# Patient Record
Sex: FEMALE | Race: WHITE | URBAN - METROPOLITAN AREA
[De-identification: names, ages, dates, MRNs, and addresses within clinical notes are randomized per-mention and may not be internally consistent; named-entity substitution may affect disease eponyms.]

---

## 2022-06-27 NOTE — PROGRESS NOTES
[facility] CARDIOLOGY  CARDIAC ELECTROPHYSIOLOGY DEPARTMENT/DIVISION OF CARDIOLOGY  Consultation Report  PATIENT: Praneeth Eisenberg  MEDICAL RECORD NUMBER: [id number]  DATE OF SERVICE:  7/1/2022  ATTENDING ELECTROPHYSIOLOGIST:  Quan Alvarez D.O.  REFERRING PHYSICIAN: Rolando Ozuna MD and Cedrick Kraus MD  CHIEF COMPLAINT: CRT-D insitu    HPI: Praneeth Eisenberg is a 57 y.o. female with a history of NYHA class II HFpEF- improved/nonischemic 2/2 cardiac sarcoidosis +/- LBBB (non-Rhianna) sp [facility] CRT-D (DOI:  2/2019 at [facility]; LV lead revision with extraction of old LV lead and implant of new LV lead: 7/23/2020 at [facility]), HTN, MR, pulmonary sarcoidosis, prior alcohol abuse, varicose veins sp stripping, ROSELYN noncompliant with CPAP, and GERD. She is managed by Dr. Naya Liz with Lipitor 40 mg daily, Coreg 12.5 mg twice daily, Jardiance 25 mg daily, glimepiride 2 mg daily, Entresto 24-26 mg twice daily, spironolactone 25 mg daily, and PPI. In 2011, patient was diagnosed with nonischemic cardiomyopathy of uncertain etiology. A cardiac MRI was concerning for cardiac sarcoidosis. She reportedly has a history of pulmonary sarcoidosis as well. Her LVEF improved with medical therapy. In 2019, she was noted to have recurrence of NICM with LBBB, which was treated with [facility] CRT-D device. In 2020, she required LV lead revision with extraction of old LV lead and implant of new LV lead due to elevated LV lead thresholds and diaphragm stim. Op note reported new LV lead was positioned in a mid, posterolateral position. Her LVEF reportedly improved from 37% to 45% on TTE in 2021. Patient presents today, 7/1/2022; to transfer EP care to my office. He denies any complaints at this time. Prior cardiac testing:   · TTE: 6/23/2021 at [facility]: EF = 45 ± 5% (2D biplane), left ventricle is mildly dilated.  Left ventricular systolic function is mildly decreased  · TTE: 7/13/2020 at [facility]: EF = 37 ±  5% (2D biplane) Grade I left ventricular diastolic dysfunction, left ventricle is moderately dilated. There is mild left ventricular   Hypertrophy  · Cardiac MRI at [facility]: 1/14/2019: LV dyssynchrony is noted, segmental wall motion, abnormalities as described above, EDV = 200 cc (normal  cc);   EDVi = 109 cc/m2 (normal 57-92 cc/m2), - LVEF = 32 % (normal 55-75%). · TTE (5/11/2015 at [facility]): LVEF = 47%, mild LV dilation  · TTE (4/18/2012): LVEF = 35%  · Cardiac MRI (2012): LVEF = 47%, LGE of thin mid myocardial in the septum consistent with nonischemic dilated cardiomyopathy  · LHC (10/24/2011): Dominant RCA, 80% ostial D1 stenosis, 60% proximal LCx stenosis, RCA spasm resolved with intracoronary nitroglycerin. · Cardiac MRI (10/21/2011): LV dilation, focal thinning of LV myocardium with mild enhancement subepicardial location involving the inferior septal segment, which can be seen with cardiac sarcoidosis. · Nuclear stress test (6/28/2011): LVEF = 20% with hypokinetic/akinetic apex, LV dilation,  · TTE (10/18/2011): LVEF = 20%, global hypokinesis, moderate LV dilation, severe R VSD, moderate RV dilation, moderate-severe MR. Past Medical History:   Diagnosis Date    [facility] [address]     Diabetes mellitus ([facility] [address]     Sarcoidosis 2007    Thyroid disease       History reviewed. No pertinent surgical history.      Family History   Problem Relation Age of Onset   Marie Zapata Cancer Mother     Heart Disease Father     Cancer Sister      There is no family history of sudden cardiac arrest    Social History     Tobacco Use    Smoking status: Never Smoker    Smokeless tobacco: Never Used   Substance Use Topics    Alcohol use: Not Currently       Current Outpatient Medications   Medication Sig Dispense Refill    sacubitril-valsartan (ENTRESTO) 24-26 MG per tablet Take 1 tablet by mouth 2 times daily      carvedilol (COREG) 12.5 MG tablet Take 12.5 mg by mouth 2 times daily (with meals)      levothyroxine (SYNTHROID) 100 MCG tablet Take 100 mcg by mouth Daily      spironolactone (ALDACTONE) 25 MG tablet Take 25 mg by mouth daily      atorvastatin (LIPITOR) 40 MG tablet Take 40 mg by mouth daily      metFORMIN (GLUCOPHAGE) 500 MG tablet Take 500 mg by mouth 2 times daily (with meals)      glimepiride (AMARYL) 2 MG tablet Take 2 mg by mouth every morning (before breakfast)      omeprazole (PRILOSEC) 20 MG delayed release capsule Take 20 mg by mouth daily      HYDROcodone-acetaminophen (NORCO) 5-325 MG per tablet Take 1 tablet by mouth every 6 hours as needed for Pain.  empagliflozin (JARDIANCE) 25 MG tablet Take 25 mg by mouth daily       No current facility-administered medications for this visit. No Known Allergies    ROS:   Constitutional: Negative for fever, activity change and appetite change. HENT: Negative for epistaxis. Eyes: Negative for diploplia, blurred vision. Respiratory: Negative for cough, chest tightness, shortness of breath and wheezing. Cardiovascular: pertinent positives in HPI  Gastrointestinal: Negative for abdominal pain and blood in stool. Genitourinary: Negative for hematuria and difficulty urinating. Musculoskeletal: Negative for myalgias and gait problem. Skin: Negative for color change and rash. Neurological: Negative for syncope and light-headedness. Psychiatric/Behavioral: Negative for confusion and agitation. The patient is not nervous/anxious.   Heme: no bleeding disorders, no melena or hematochezia  All other review of systems are negative     PHYSICAL EXAM:  BP 98/72 (Site: Left Upper Arm, Position: Sitting, Cuff Size: Medium Adult)   Pulse 68   Resp 18   Ht 5' 4\" (1.626 m)   Wt 160 lb 9.6 oz (72.8 kg)   BMI 27.57 kg/m²    Constitutional: Well-developed, no acute distress, well groomed  Eyes: conjunctivae normal, no xanthelasma   Ears, Nose, Throat: oral mucosa moist, no cyanosis   Neck: supple, no JVD, no thyromegaly   CV: normal rate, regular rhythm,  no murmurs, rubs, or gallops. PMI is nondisplaced, Peripheral pulses normal including  bilateral radial and pedal pulses are normal in quality  Lungs: clear to auscultation bilaterally, normal respiratory effort without used of accessory muscles, no wheezes  Abdomen: soft, non-tender, bowel sounds present, no masses or hepatomegaly   Extremities: no digital clubbing, no edema   Skin: warm, no rashes   Neuro/Psych: A&O x 3, normal mood and affect  ICD site: Incision clean dry and intact without erythema edema or drainage. Cardiac testing done today:   · EC22: Sinus-V paced, QRS D = 110 ms, QTC = 432 ms  · Device Interrogation/Reprogramming 2022   · Make/Model: CHUCK LAWSONBREON Larry [id number]  · DOI: 2019  · Battery: 3.3 years  · Carron Quirk therapy: DDD  ppm, simultaneous ventricular pacing, D1-M2  · Pacing %: RA = 9.1%, BiV = 98%  · Tachy therapy:  · VF: 222 bpm, ATP x1, 36 J, 40 J, 40 J x 4  · FVT: 8 187 bpm, ATP x3, 36 J, 36 J, 40 J x 2  · VT: 166 bpm, monitor only  · Lead function:  · RA lead: sensing = 2.2 mV, impedance = 490 ohms, threshold = 0.5 V @0.5 msec  · RV lead: sensing = 11.8 mV, impedance = 650 ohms (HV = 86 ohms), threshold = 0.75 V @0.5 msec  · LV lead: sensing = N/A, impedance = 940 ohms, threshold = 1.0 V @ 1.0 msec    Lead programming:  · RA lead: sensitivity = auto mV, output = 1.5 V @0.5 msec  · RV lead: sensitivity = auto mV, output = 2.0 V @0.5 msec  · LV lead: sensitivity = N/A, output = 2.0 V @1.0 msec  · Arrhythmias: None  · Reprogramming included: None  · Overall device function is normal  · All device programmable settings were evaluated per above and in the scanned document, along with iterative adjustments (capture thresholds) to assess and select the most appropriate final programming to provide for consistent delivery of the appropriate therapy and to verify function of the device. Assessment/plan:  1.   NYHA class II HFpEF-improved/borderline/presumed nonischemic 2/2 cardiac sarcoidosis+/- LBBB sp [facility] CRT-D (DOI: 2/2019 at [facility]; LV lead revision: 7/2020 at [facility])  - Etiology of patient's cardiomyopathy is unclear to me. There are reports in her chart of possible cardiac sarcoidosis based on cardiac MRI and history of pulmonary sarcoidosis. However, patient reports she never had pulmonary biopsy or biopsy of anything. She is uncertain how the diagnosis of pulmonary sarcoidosis was made. She did have a non-Rhianna LBBB. A [facility] CRT-D device was implanted in 2019. Her LVEF did improve 37% to 45% based on echo in 2021. Appears no ischemia eval was performed with recurrence of LV systolic dysfunction. Her prior left heart cath in 2012 did note areas of significant stenosis. She complains of fatigue. Recommend pharmacologic nuclear stress test to further evaluate. - Stable device function.  - Patient to enroll in remote monitoring with my office every 91 days.  - Follow-up my office in 1 year. - Medical management per Dr. Yaneth Billings. 2.  ROSELYN  - Noncompliant with CPAP. - She complains of fatigue.  - Recommend she consider evaluation by sleep medicine for CPAP. She will discuss with PCP. I spent a total of 60 minutes reviewing previous notes, test results, and face to face with the patient discussing the diagnosis and importance of compliance with the treatment plan as well as documenting on the day of the visit. Time of the day of service includes:  · Preparing to see the patient (eg. Review of the medical record, such as tests). · Obtaining and/or reviewing separately obtained history. · Ordering prescription medications, tests, and/or procedures. · Communicating results to the patient/family/caregiver. · Counseling/educating the patient/family/caregiver. · Documenting clinical information in the patients electronic record. · Coordination of care for the patient. · Performing a medical appropriate exam and/or evaluation.       Thank you for allowing me to participate in your patient's care. Please call me if there are any questions. Shlomo Hurley D.O.   Cardiac Electrophysiology  [facility]  [facility]) Physicians      CC: Lakia Galeas MD

## 2022-07-01 ENCOUNTER — OFFICE VISIT (OUTPATIENT)
Dept: NON INVASIVE DIAGNOSTICS | Age: 62
End: 2022-07-01
Payer: COMMERCIAL

## 2022-07-01 VITALS
BODY MASS INDEX: 27.42 KG/M2 | SYSTOLIC BLOOD PRESSURE: 98 MMHG | WEIGHT: 160.6 LBS | HEART RATE: 68 BPM | DIASTOLIC BLOOD PRESSURE: 72 MMHG | RESPIRATION RATE: 18 BRPM | HEIGHT: 64 IN

## 2022-07-01 DIAGNOSIS — I50.42 CHRONIC COMBINED SYSTOLIC AND DIASTOLIC HEART FAILURE (HCC): ICD-10-CM

## 2022-07-01 DIAGNOSIS — Z95.810 CARDIAC RESYNCHRONIZATION THERAPY DEFIBRILLATOR (CRT-D) IN PLACE: Primary | ICD-10-CM

## 2022-07-01 PROBLEM — G47.33 OSA (OBSTRUCTIVE SLEEP APNEA): Status: ACTIVE | Noted: 2022-07-01

## 2022-07-01 PROBLEM — T81.30XA WOUND DEHISCENCE: Status: ACTIVE | Noted: 2022-07-01

## 2022-07-01 PROCEDURE — 99205 OFFICE O/P NEW HI 60 MIN: CPT | Performed by: STUDENT IN AN ORGANIZED HEALTH CARE EDUCATION/TRAINING PROGRAM

## 2022-07-01 PROCEDURE — 93000 ELECTROCARDIOGRAM COMPLETE: CPT | Performed by: STUDENT IN AN ORGANIZED HEALTH CARE EDUCATION/TRAINING PROGRAM

## 2022-07-01 RX ORDER — HYDROCODONE BITARTRATE AND ACETAMINOPHEN 5; 325 MG/1; MG/1
1 TABLET ORAL EVERY 6 HOURS PRN
COMMUNITY

## 2022-07-01 RX ORDER — ATORVASTATIN CALCIUM 40 MG/1
40 TABLET, FILM COATED ORAL DAILY
COMMUNITY

## 2022-07-01 RX ORDER — OMEPRAZOLE 20 MG/1
20 CAPSULE, DELAYED RELEASE ORAL DAILY
COMMUNITY

## 2022-07-01 RX ORDER — SACUBITRIL AND VALSARTAN 24; 26 MG/1; MG/1
1 TABLET, FILM COATED ORAL 2 TIMES DAILY
COMMUNITY

## 2022-07-01 RX ORDER — CARVEDILOL 12.5 MG/1
12.5 TABLET ORAL 2 TIMES DAILY WITH MEALS
COMMUNITY

## 2022-07-01 RX ORDER — SPIRONOLACTONE 25 MG/1
25 TABLET ORAL DAILY
COMMUNITY

## 2022-07-01 RX ORDER — GLIMEPIRIDE 2 MG/1
2 TABLET ORAL
COMMUNITY

## 2022-07-01 RX ORDER — LEVOTHYROXINE SODIUM 0.1 MG/1
100 TABLET ORAL DAILY
COMMUNITY

## 2022-07-01 NOTE — PATIENT INSTRUCTIONS
No medication changes at this time. You will be contacted to schedule stress test.  You will be contacted to transfer remote monitoring of ICD to my office every 91 days. Follow-up with my office in 6 months.

## 2022-07-05 ENCOUNTER — TELEPHONE (OUTPATIENT)
Dept: NON INVASIVE DIAGNOSTICS | Age: 62
End: 2022-07-05

## 2022-07-05 NOTE — TELEPHONE ENCOUNTER
----- Message from Derek Pino DO sent at 7/1/2022 12:53 PM EDT -----  Regarding: remote  Please transfer patient's remote to our office.  -Derek Pino DO

## 2022-07-15 ENCOUNTER — TELEPHONE (OUTPATIENT)
Dept: CARDIOLOGY | Age: 62
End: 2022-07-15

## 2022-07-25 ENCOUNTER — TELEPHONE (OUTPATIENT)
Dept: CARDIOLOGY | Age: 62
End: 2022-07-25

## 2022-07-25 NOTE — TELEPHONE ENCOUNTER
Left message for pt to confirm stress for 7/27/22. Instructions given included: nothing to eat/drink after midnight except sips of water, hold all forms of caffeine for 12 hrs prior to test, Check BS in am and hold all DM meds the morning of test.  Advised to call with any questions.

## 2022-07-27 ENCOUNTER — HOSPITAL ENCOUNTER (OUTPATIENT)
Dept: CARDIOLOGY | Age: 62
Discharge: HOME | End: 2022-07-27
Payer: COMMERCIAL

## 2022-07-27 VITALS
RESPIRATION RATE: 16 BRPM | HEART RATE: 60 BPM | BODY MASS INDEX: 27.31 KG/M2 | SYSTOLIC BLOOD PRESSURE: 108 MMHG | HEIGHT: 64 IN | DIASTOLIC BLOOD PRESSURE: 64 MMHG | WEIGHT: 160 LBS

## 2022-07-27 DIAGNOSIS — I50.42 CHRONIC COMBINED SYSTOLIC AND DIASTOLIC HEART FAILURE (HCC): ICD-10-CM

## 2022-07-27 DIAGNOSIS — R94.31 ABNORMAL EKG: Primary | ICD-10-CM

## 2022-07-27 PROCEDURE — 2580000003 HC RX 258: Performed by: INTERNAL MEDICINE

## 2022-07-27 PROCEDURE — 93017 CV STRESS TEST TRACING ONLY: CPT

## 2022-07-27 PROCEDURE — 78452 HT MUSCLE IMAGE SPECT MULT: CPT

## 2022-07-27 PROCEDURE — 3430000000 HC RX DIAGNOSTIC RADIOPHARMACEUTICAL: Performed by: INTERNAL MEDICINE

## 2022-07-27 PROCEDURE — 6360000002 HC RX W HCPCS: Performed by: STUDENT IN AN ORGANIZED HEALTH CARE EDUCATION/TRAINING PROGRAM

## 2022-07-27 PROCEDURE — A9500 TC99M SESTAMIBI: HCPCS | Performed by: INTERNAL MEDICINE

## 2022-07-27 RX ORDER — SODIUM CHLORIDE 0.9 % (FLUSH) 0.9 %
10 SYRINGE (ML) INJECTION PRN
Status: DISCONTINUED | OUTPATIENT
Start: 2022-07-27 | End: 2022-07-28 | Stop reason: HOSPADM

## 2022-07-27 RX ADMIN — REGADENOSON 0.4 MG: 0.08 INJECTION, SOLUTION INTRAVENOUS at 10:42

## 2022-07-27 RX ADMIN — SODIUM CHLORIDE, PRESERVATIVE FREE 10 ML: 5 INJECTION INTRAVENOUS at 10:43

## 2022-07-27 RX ADMIN — SODIUM CHLORIDE, PRESERVATIVE FREE 10 ML: 5 INJECTION INTRAVENOUS at 09:34

## 2022-07-27 RX ADMIN — SODIUM CHLORIDE, PRESERVATIVE FREE 10 ML: 5 INJECTION INTRAVENOUS at 10:42

## 2022-07-27 RX ADMIN — Medication 10.2 MILLICURIE: at 09:34

## 2022-07-27 RX ADMIN — Medication 31.5 MILLICURIE: at 10:42

## 2022-07-27 NOTE — PROCEDURES
[address] V[address] [facility]   [address] [facility] [address], [address] [facility]  [phone]                Pharmacologic Stress Nuclear Gated SPECT Study    Name: [address] Account Number: [de-identified]    :  1960          Sex: female         Date of Study:  2022    Height: 5' 4\" (162.6 cm)         Weight: 160 lb (72.6 kg)     Ordering Provider: rTupti Giordano DO          PCP: Nolan Camargo MD      Cardiologist: Marco Antonio Locke MD             Interpreting Physician: Gia Flores MD  _________________________________________________________________________________    Indication:   Detecting the presence and location of coronary artery disease    Clinical History:   Patient has no known history of coronary artery disease. Resting ECG:    VT int .20 sec, QRS int .08 sec, QT int . 40 sec; HR 60 bpm  Normal sinus rhythm and Poor R wave progression    Procedure:   Pharmacologic stress testing was performed with regadenoson 0.4 mg for 15 seconds. Additionally, low-level exercise was performed along with the infusion. The heart rate was 60 at baseline and serafin to 103 beats during the infusion. This corresponds to 66% of maximum predicted heart rate. The blood pressure at baseline was 108/64 and blood pressure at the end of infusion was 122/84. Blood pressure response was normal during the stress procedure. The patient experienced mild shortness of breath and lightheadedness during the infusion that resolved in recovery. ECG during the infusion did not change. IMAGING: Myocardial perfusion imaging was performed at rest 30-35 minutes following the intravenous injection of 10.2 mCi of (Tc-Sestamibi) followed by 10 ml of Normal Saline. As per infusion protocol, the patient was injected intravenously with 31.5 mCi of (Tc-Sestamibi) followed by 10 ml of Normal Saline.   Gated post-stress tomographic imaging was performed 20-25 minutes after

## 2022-07-27 NOTE — DISCHARGE INSTRUCTIONS
[address] and Vascular Lab      Instructions to Patients    The following are the instructions for patients who have had a procedure in our office today. Patient name: Josselyn Christensen    Radionuclide Activity: 40mCi of 99mTc-Sestamibi    Date Administered: 7/27/2022    Expires: 48 hours after scheduled appointment time      Patient may resume normal activity unless otherwise instructed. Patient may resume medications as normal.  If the need should arise, patient may call [phone] between the hours of 8:00am-5:00pm.  After hours there is at least one physician on-call at all times for those patients needing assistance. Patients may call [phone] and the answering service will direct the patient to one of our physicians for assistance. After the patient's test if they are going to be leaving from an airport in the near future they should take this letter with them to verify the test and radionuclide used for their test.      This letter verifies that the above named bearer received an injection of a radionuclide for medical purpose/usage only.         Electronically signed by Sophie Dumont on 7/27/2022 at 10:38 AM

## 2022-08-18 ENCOUNTER — TELEPHONE (OUTPATIENT)
Dept: NON INVASIVE DIAGNOSTICS | Age: 62
End: 2022-08-18

## 2022-08-18 NOTE — TELEPHONE ENCOUNTER
----- Message from Wyatt Levy DO sent at 8/10/2022 12:54 PM EDT -----  Regarding: TWOS  Remote recieved.  Issues with TWOS in the past. Can you bring her in to increase RV sensitivity from 0.5 mV to 2.0 mV?    -Wyatt Levy, DO
Scheduled device clinic appointment for 9/1/2022 @ 1:00    Alyce Gonzalez
[id number] [address]

## 2022-09-01 ENCOUNTER — NURSE ONLY (OUTPATIENT)
Dept: NON INVASIVE DIAGNOSTICS | Age: 62
End: 2022-09-01

## 2022-09-01 NOTE — PROGRESS NOTES
Normal In-Office: With Events  1  Normal Device Function  Events or Alerts: 41  Battery: --, 3.10 yrs  Sensing, impedance and thresholds reviewed and tested  Presenting Rhythm: was reviewed  Underlying Rhythm: was reviewed  Heart Rate Histograms reviewed  Pacing and Detection Parameters were evaluated  Additional Notes:  Presenting: AsBp @ 62 Underlying: AsVs @ 60    Created By: Major Michaels 09/01/2022 14:54  Tachycardia: High Ventricular Rate  1  High ventricular rate event(s) detected within programmed monitor zone  Total episodes: 41    Additional Notes:  TWOS noted on device. Per Dr. Serg Marina RV sensitivity changed from 0.5 mV to 2.0 mV. Discussed changes with Brannon Zuleta representative who assisted with changes. Plan: Will continue with every 91 day remote Merlin transmissions Recall Dr. Serg Marina 1/1/2023.     Major Michaels RN, BSN  Sathya

## 2022-09-26 ENCOUNTER — TELEPHONE (OUTPATIENT)
Dept: NON INVASIVE DIAGNOSTICS | Age: 62
End: 2022-09-26

## 2022-09-26 NOTE — TELEPHONE ENCOUNTER
----- Message from Hakeem Cordova DO sent at 9/21/2022  8:13 PM EDT -----  Regarding: RE: Shanae Thomas DO   ----- Message -----  From: Dany Lawson RN  Sent: 9/9/2022  11:07 AM EDT  To: Hakeem Cordova DO  Subject: RE: TWOS                                         Discussed with Peter Tapia from [facility]. The frequency attenuation filter does not apply to this issue. Recommending further programming of post paced threshold start to 1.2 mv. Ok to bring patient in for reprogramming?   ----- Message -----  From: Hakeem Cordova DO  Sent: 9/7/2022   9:47 PM EDT  To: Dany Lawson RN  Subject: TWOS                                             Patient had TWOS on prior interrogation so RV sensitivity changed from 0.5 to 2.0 mV. Please assess if this [facility] device offers the low frequency attenuation filter.  If device provides this filter, please review with [facility] tech service if they recommend it for this patient.    -Hakeem Cordova DO

## 2022-09-27 ENCOUNTER — TELEPHONE (OUTPATIENT)
Dept: NON INVASIVE DIAGNOSTICS | Age: 62
End: 2022-09-27

## 2022-09-27 NOTE — TELEPHONE ENCOUNTER
Patient is not able to come into the office this Friday. Rescheduled appointment for 10/14/22. She needs to request time off from work.     Cricket Steve RN, BSN  Sathya

## 2022-10-14 ENCOUNTER — NURSE ONLY (OUTPATIENT)
Dept: NON INVASIVE DIAGNOSTICS | Age: 62
End: 2022-10-14

## 2022-12-22 ENCOUNTER — TELEPHONE (OUTPATIENT)
Dept: NON INVASIVE DIAGNOSTICS | Age: 62
End: 2022-12-22

## 2022-12-22 NOTE — TELEPHONE ENCOUNTER
----- Message from Nighat Castanon DO sent at 12/21/2022 10:32 PM EST -----  Regarding: appt  Remote with AsBiVp w/ intermittent post-paced TWOS followed by an AsVs complexes    Per [facility]: Program RV post-paced decay delay to 1.3mV (currently 1.0mV) Atrial post-paced decay delay programmed 1.2mV 10/14/2022 - prev 0.8mV (nominal)    Recommend:  1. Please bring patient into device clinic to perform the above.     Nighat Castanon DO

## 2023-04-20 NOTE — PROGRESS NOTES
obtained history. Communicating results to the patient/family/caregiver. Counseling/educating the patient/family/caregiver. Documenting clinical information in the patients electronic record. Coordination of care for the patient. Performing a medical appropriate exam and/or evaluation. Thank you for allowing me to participate in your patient's care. Please call me if there are any questions. Vanna Pennington D.O.   Cardiac Electrophysiology  [facility]  [facility]) Physicians      CC: Chloe Bazan MD

## 2023-04-21 ENCOUNTER — OFFICE VISIT (OUTPATIENT)
Dept: NON INVASIVE DIAGNOSTICS | Age: 63
End: 2023-04-21

## 2023-04-21 VITALS
DIASTOLIC BLOOD PRESSURE: 76 MMHG | WEIGHT: 159 LBS | SYSTOLIC BLOOD PRESSURE: 112 MMHG | HEIGHT: 64 IN | HEART RATE: 60 BPM | RESPIRATION RATE: 16 BRPM | BODY MASS INDEX: 27.14 KG/M2

## 2023-04-21 DIAGNOSIS — Z95.810 CARDIAC RESYNCHRONIZATION THERAPY DEFIBRILLATOR (CRT-D) IN PLACE: Primary | ICD-10-CM

## 2023-04-21 DIAGNOSIS — I42.8 NONISCHEMIC CARDIOMYOPATHY (HCC): ICD-10-CM

## 2023-04-21 NOTE — PATIENT INSTRUCTIONS
No medication changes at this time. Continue remote monitoring every 91 days. Follow-up with Dr Jason Grace office in 1 year. Please follow-up with billing department regarding cost of last visit.

## 2023-07-31 PROCEDURE — 93296 REM INTERROG EVL PM/IDS: CPT | Performed by: STUDENT IN AN ORGANIZED HEALTH CARE EDUCATION/TRAINING PROGRAM

## 2023-07-31 PROCEDURE — 93295 DEV INTERROG REMOTE 1/2/MLT: CPT | Performed by: STUDENT IN AN ORGANIZED HEALTH CARE EDUCATION/TRAINING PROGRAM

## 2023-08-01 PROCEDURE — G2066 INTER DEVC REMOTE 30D: HCPCS | Performed by: STUDENT IN AN ORGANIZED HEALTH CARE EDUCATION/TRAINING PROGRAM

## 2023-08-01 PROCEDURE — 93297 REM INTERROG DEV EVAL ICPMS: CPT | Performed by: STUDENT IN AN ORGANIZED HEALTH CARE EDUCATION/TRAINING PROGRAM

## 2023-10-30 PROCEDURE — 93295 DEV INTERROG REMOTE 1/2/MLT: CPT | Performed by: STUDENT IN AN ORGANIZED HEALTH CARE EDUCATION/TRAINING PROGRAM

## 2023-10-30 PROCEDURE — 93296 REM INTERROG EVL PM/IDS: CPT | Performed by: STUDENT IN AN ORGANIZED HEALTH CARE EDUCATION/TRAINING PROGRAM

## 2023-10-31 PROCEDURE — 93297 REM INTERROG DEV EVAL ICPMS: CPT | Performed by: STUDENT IN AN ORGANIZED HEALTH CARE EDUCATION/TRAINING PROGRAM

## 2023-10-31 PROCEDURE — G2066 INTER DEVC REMOTE 30D: HCPCS | Performed by: STUDENT IN AN ORGANIZED HEALTH CARE EDUCATION/TRAINING PROGRAM

## 2023-12-15 ENCOUNTER — OFFICE VISIT (OUTPATIENT)
Dept: NON INVASIVE DIAGNOSTICS | Age: 63
End: 2023-12-15

## 2023-12-15 VITALS
DIASTOLIC BLOOD PRESSURE: 60 MMHG | HEIGHT: 64 IN | SYSTOLIC BLOOD PRESSURE: 102 MMHG | BODY MASS INDEX: 27.29 KG/M2 | HEART RATE: 67 BPM

## 2023-12-15 DIAGNOSIS — Z95.810 CARDIAC RESYNCHRONIZATION THERAPY DEFIBRILLATOR (CRT-D) IN PLACE: ICD-10-CM

## 2023-12-15 DIAGNOSIS — I42.8 NONISCHEMIC CARDIOMYOPATHY (HCC): Primary | ICD-10-CM

## 2023-12-15 DIAGNOSIS — G47.33 OSA (OBSTRUCTIVE SLEEP APNEA): ICD-10-CM

## 2023-12-15 DIAGNOSIS — I50.42 CHRONIC COMBINED SYSTOLIC AND DIASTOLIC HEART FAILURE (HCC): ICD-10-CM

## 2023-12-15 DIAGNOSIS — I10 HYPERTENSION, UNSPECIFIED TYPE: ICD-10-CM

## 2023-12-15 RX ORDER — ERGOCALCIFEROL 1.25 MG/1
50000 CAPSULE ORAL WEEKLY
COMMUNITY
Start: 2023-10-28

## 2023-12-15 NOTE — PROGRESS NOTES
\"PLT\"  No results found for: \"NA\", \"K\", \"CL\", \"CO2\", \"BUN\", \"CREATININE\", \"GLUCOSE\", \"CALCIUM\", \"LABGLOM\"   No results found for: \"MG\"  No results found for: \"TSH\", \"TSHREFLEX\", \"TSHFT4\", \"TSHELE\", \"EHC9VKI\", \"TSHHS\"    EKG: SR with V pacing rate of 75 bpm   Please see scan in Cardiology. Device Interrogation/Reprogramming  Make/Model: Lorenzo Ding  DOI: Generator and RV lead: 2/12/2019, LV lead 7/23/2020  Battery: 2 years  TRENGEREID therapy: DDD 60-1 20 ppm  Pacing %: RA = 6%, BiV =  98 %  Tachy therapy:  VF: 222, 36 J, 40 J, 40 J x 4  FVT: 188, 3 times burst plus scan, 36 J, 36 J, 40 J x 2  VT: Monitor 167 bpm  Lead function:  RA lead: sensing = 1.9 mV, impedance = 450 ohms, threshold = 1.5 V @ 0.5 msec  RV lead: sensing = 11.8 mV, impedance = 490 ohms (HV = 72), threshold = 0.7 V @ 0.5 msec  LV lead: sensing = N/A, impedance = 810 ohms, threshold = 1.0 V @ 1.0 msec  Lead programming:  RA lead: sensitivity = auto, output = 2.5 V @ 0.5 msec  RV lead: sensitivity = auto, output = 2.0 V @ 0.5 msec  LV lead: sensitivity = N/A, output = 2.0 V @ 1.0 msec  Arrhythmias: none   Overall device function is normal  All device programmable settings were evaluated per above and in the scanned document, along with iterative adjustments (capture thresholds) to assess and select the most appropriate final programming to provide for consistent delivery of the appropriate therapy and to verify function of the device. Assessment and plan:    1. NYHA class II HFpEF-improved/borderline/nonischemic 2/2 LBBB sp [facility] CRT-D (DOI: 2/2019 at [facility]; LV lead revision: 7/2020 at [facility])  - CRT eval:  -- Indication: LVEF < 35% with non-Rhianna LBBB  -- LV lead location: mid, posterolateral pre op report. No PA/LAT CXR available to me. -- BiV pacing %: 98%  -- LVEF response: pre-CRT = 32% (MRI); post-CRT = 45% (TTE)  - Stable device function.        2. NICM/possible cardiac Sarcoidosis  - There are reports in her chart of possible cardiac

## 2024-01-14 ENCOUNTER — APPOINTMENT (OUTPATIENT)
Dept: GENERAL RADIOLOGY | Age: 64
End: 2024-01-14
Payer: MEDICARE

## 2024-01-14 ENCOUNTER — HOSPITAL ENCOUNTER (EMERGENCY)
Age: 64
Discharge: HOME | End: 2024-01-14
Payer: MEDICARE

## 2024-01-14 VITALS
SYSTOLIC BLOOD PRESSURE: 122 MMHG | TEMPERATURE: 98.1 F | HEART RATE: 66 BPM | WEIGHT: 155 LBS | DIASTOLIC BLOOD PRESSURE: 80 MMHG | RESPIRATION RATE: 18 BRPM | BODY MASS INDEX: 26.46 KG/M2 | OXYGEN SATURATION: 99 % | HEIGHT: 64 IN

## 2024-01-14 DIAGNOSIS — M62.838 SPASM OF MUSCLE: Primary | ICD-10-CM

## 2024-01-14 PROCEDURE — 73030 X-RAY EXAM OF SHOULDER: CPT

## 2024-01-14 PROCEDURE — 6360000002 HC RX W HCPCS: Performed by: NURSE PRACTITIONER

## 2024-01-14 PROCEDURE — 99211 OFF/OP EST MAY X REQ PHY/QHP: CPT

## 2024-01-14 PROCEDURE — 6370000000 HC RX 637 (ALT 250 FOR IP): Performed by: NURSE PRACTITIONER

## 2024-01-14 RX ORDER — NAPROXEN 500 MG/1
500 TABLET ORAL ONCE
Status: DISCONTINUED | OUTPATIENT
Start: 2024-01-14 | End: 2024-01-14

## 2024-01-14 RX ORDER — CYCLOBENZAPRINE HCL 10 MG
10 TABLET ORAL 3 TIMES DAILY PRN
Qty: 12 TABLET | Refills: 0 | Status: SHIPPED | OUTPATIENT
Start: 2024-01-14 | End: 2024-01-18

## 2024-01-14 RX ORDER — NAPROXEN 250 MG/1
500 TABLET ORAL ONCE
Status: COMPLETED | OUTPATIENT
Start: 2024-01-14 | End: 2024-01-14

## 2024-01-14 RX ORDER — NAPROXEN 500 MG/1
500 TABLET ORAL 2 TIMES DAILY WITH MEALS
Qty: 20 TABLET | Refills: 0 | Status: SHIPPED | OUTPATIENT
Start: 2024-01-14

## 2024-01-14 RX ORDER — ORPHENADRINE CITRATE 30 MG/ML
60 INJECTION INTRAMUSCULAR; INTRAVENOUS ONCE
Status: COMPLETED | OUTPATIENT
Start: 2024-01-14 | End: 2024-01-14

## 2024-01-14 RX ORDER — LIDOCAINE 50 MG/G
1 PATCH TOPICAL DAILY PRN
Qty: 10 PATCH | Refills: 0 | Status: SHIPPED | OUTPATIENT
Start: 2024-01-14 | End: 2024-01-24

## 2024-01-14 RX ORDER — CYCLOBENZAPRINE HCL 10 MG
10 TABLET ORAL 3 TIMES DAILY PRN
Qty: 12 TABLET | Refills: 0 | Status: SHIPPED | OUTPATIENT
Start: 2024-01-14 | End: 2024-01-14

## 2024-01-14 RX ORDER — CYCLOBENZAPRINE HCL 10 MG
10 TABLET ORAL ONCE
Status: DISCONTINUED | OUTPATIENT
Start: 2024-01-14 | End: 2024-01-14

## 2024-01-14 RX ADMIN — NAPROXEN 500 MG: 250 TABLET ORAL at 11:52

## 2024-01-14 RX ADMIN — ORPHENADRINE CITRATE 60 MG: 60 INJECTION INTRAMUSCULAR; INTRAVENOUS at 11:53

## 2024-01-14 ASSESSMENT — PAIN DESCRIPTION - LOCATION
LOCATION: SHOULDER
LOCATION: SHOULDER

## 2024-01-14 ASSESSMENT — PAIN SCALES - GENERAL
PAINLEVEL_OUTOF10: 9
PAINLEVEL_OUTOF10: 9

## 2024-01-14 ASSESSMENT — PAIN DESCRIPTION - ORIENTATION
ORIENTATION: RIGHT
ORIENTATION: RIGHT

## 2024-01-14 ASSESSMENT — PAIN DESCRIPTION - DESCRIPTORS
DESCRIPTORS: ACHING
DESCRIPTORS: ACHING

## 2024-01-14 NOTE — ED PROVIDER NOTES
less likely.  Pain is somewhat reproducible.  Did have a lengthy discussion with patient and spouse that there is no bony abnormality, will trial a short course of NSAIDs and muscle relaxers for symptom control as well as lidocaine patch however if she has any new or worsening symptoms such as chest pain, shortness of breath, heart racing or worsening pain she needs to be seen in the emergency room.  Patient and  agreeable with plan and verbalized understanding.    Plan of Care/Counseling:  MICH Smith CNP reviewed today's visit with the patient in addition to providing specific details for the plan of care and counseling regarding the diagnosis and prognosis.  Questions are answered at this time and are agreeable with the plan.    Assessment      1. Spasm of muscle      Plan   Discharged home.  Patient condition is good    New Medications     Discharge Medication List as of 1/14/2024 11:54 AM        START taking these medications    Details   naproxen (NAPROSYN) 500 MG tablet Take 1 tablet by mouth 2 times daily (with meals), Disp-20 tablet, R-0Normal      lidocaine (LIDODERM) 5 % Place 1 patch onto the skin daily as needed for Pain 12 hours on, 12 hours off., Disp-10 patch, R-0Normal           Electronically signed by MICH Smith CNP   DD: 1/14/24  **This report was transcribed using voice recognition software. Every effort was made to ensure accuracy; however, inadvertent computerized transcription errors may be present.  END OF ED PROVIDER NOTE          Cassi Calero APRN - CNP  01/14/24 [id number]

## 2024-01-29 PROCEDURE — 93297 REM INTERROG DEV EVAL ICPMS: CPT | Performed by: STUDENT IN AN ORGANIZED HEALTH CARE EDUCATION/TRAINING PROGRAM

## 2024-01-29 PROCEDURE — 93295 DEV INTERROG REMOTE 1/2/MLT: CPT | Performed by: STUDENT IN AN ORGANIZED HEALTH CARE EDUCATION/TRAINING PROGRAM

## 2024-01-29 PROCEDURE — 93296 REM INTERROG EVL PM/IDS: CPT | Performed by: STUDENT IN AN ORGANIZED HEALTH CARE EDUCATION/TRAINING PROGRAM

## 2024-06-07 ENCOUNTER — OFFICE VISIT (OUTPATIENT)
Dept: NON INVASIVE DIAGNOSTICS | Age: 64
End: 2024-06-07

## 2024-06-07 ENCOUNTER — NURSE ONLY (OUTPATIENT)
Dept: NON INVASIVE DIAGNOSTICS | Age: 64
End: 2024-06-07

## 2024-06-07 VITALS
HEIGHT: 64 IN | HEART RATE: 66 BPM | BODY MASS INDEX: 26.98 KG/M2 | SYSTOLIC BLOOD PRESSURE: 110 MMHG | DIASTOLIC BLOOD PRESSURE: 68 MMHG | WEIGHT: 158 LBS | RESPIRATION RATE: 18 BRPM

## 2024-06-07 DIAGNOSIS — I49.9 IRREGULAR HEART BEAT: Primary | ICD-10-CM

## 2024-06-07 DIAGNOSIS — D86.9 SARCOIDOSIS: ICD-10-CM

## 2024-06-07 DIAGNOSIS — Z95.810 CARDIAC RESYNCHRONIZATION THERAPY DEFIBRILLATOR (CRT-D) IN PLACE: Primary | ICD-10-CM

## 2024-06-07 RX ORDER — LEVOTHYROXINE SODIUM 88 UG/1
TABLET ORAL
COMMUNITY
Start: 2024-05-07

## 2024-06-07 RX ORDER — GLIMEPIRIDE 4 MG/1
4 TABLET ORAL DAILY
COMMUNITY
Start: 2024-05-07

## 2024-06-07 NOTE — PATIENT INSTRUCTIONS
No medication changes at this time.  Continue remote monitoring of pacemaker every 91 days.  In the future, Dr Dobson office will contact you to arrange lead revision procedure.  Follow-up with this office in ~3 months.

## 2024-06-07 NOTE — PROGRESS NOTES
Prior cardiac testing:   Pharmacologic Stress Nuclear: 2022: LVEF = 75%, normal myocardial perfusion.  EC22: Sinus-V paced, QRS D = 110 ms, QTC = 432 ms  [facility]E: [facility]: EF = 45 ± 5% (2D biplane), left ventricle is mildly dilated. Left ventricular systolic function is mildly decreased  TTE: 2020 at [facility]: EF = 37 ±  5% (2D biplane) Grade I left ventricular diastolic dysfunction, left ventricle is moderately dilated. There is mild left ventricular   Hypertrophy  Cardiac MRI at [facility]: 2019: LV dyssynchrony is noted, segmental wall motion, abnormalities as described above, EDV = 200 cc (normal  cc);   EDVi = 109 cc/m2 (normal 57-92 cc/m2), - LVEF = 32 % (normal 55-75%).  TTE (2015 at [facility]): LVEF = 47%, mild LV dilation  TTE (2012): LVEF = 35%  Cardiac MRI (): LVEF = 47%, LGE of thin mid myocardial in the septum consistent with nonischemic dilated cardiomyopathy  LHC (10/24/2011): Dominant RCA, 80% ostial D1 stenosis, 60% proximal LCx stenosis, RCA spasm resolved with intracoronary nitroglycerin.  Cardiac MRI (10/21/2011): LV dilation, focal thinning of LV myocardium with mild enhancement subepicardial location involving the inferior septal segment, which can be seen with cardiac sarcoidosis.  Nuclear stress test (2011): LVEF = 20% with hypokinetic/akinetic apex, LV dilation,  TTE (10/18/2011): LVEF = 20%, global hypokinesis, moderate LV dilation, severe R VSD, moderate RV dilation, moderate-severe MR.       Past Medical History:   Diagnosis Date    CHF (congestive heart failure) (HCC)     Diabetes mellitus (HCC)     Sarcoidosis 2007    Thyroid disease     History reviewed. No pertinent surgical history.   Social History     Tobacco Use    Smoking status: Never    Smokeless tobacco: Never   Vaping Use    Vaping Use: Never used   Substance Use Topics    Alcohol use: Not Currently    Drug use: Never      Family History   Problem Relation Age of Onset    Cancer Mother

## 2024-07-29 PROCEDURE — 93296 REM INTERROG EVL PM/IDS: CPT | Performed by: STUDENT IN AN ORGANIZED HEALTH CARE EDUCATION/TRAINING PROGRAM

## 2024-07-29 PROCEDURE — 93295 DEV INTERROG REMOTE 1/2/MLT: CPT | Performed by: STUDENT IN AN ORGANIZED HEALTH CARE EDUCATION/TRAINING PROGRAM

## 2024-08-05 ENCOUNTER — OFFICE VISIT (OUTPATIENT)
Dept: PULMONOLOGY | Age: 64
End: 2024-08-05
Payer: MEDICARE

## 2024-08-05 VITALS
RESPIRATION RATE: 16 BRPM | WEIGHT: 156 LBS | BODY MASS INDEX: 26.63 KG/M2 | DIASTOLIC BLOOD PRESSURE: 68 MMHG | SYSTOLIC BLOOD PRESSURE: 108 MMHG | TEMPERATURE: 97.3 F | OXYGEN SATURATION: 97 % | HEART RATE: 68 BPM | HEIGHT: 64 IN

## 2024-08-05 DIAGNOSIS — D86.9 SARCOIDOSIS: Primary | ICD-10-CM

## 2024-08-05 DIAGNOSIS — R05.3 CHRONIC COUGH: ICD-10-CM

## 2024-08-05 PROCEDURE — 99203 OFFICE O/P NEW LOW 30 MIN: CPT | Performed by: INTERNAL MEDICINE

## 2024-08-05 PROCEDURE — 3074F SYST BP LT 130 MM HG: CPT | Performed by: INTERNAL MEDICINE

## 2024-08-05 PROCEDURE — 99205 OFFICE O/P NEW HI 60 MIN: CPT | Performed by: INTERNAL MEDICINE

## 2024-08-05 PROCEDURE — 1123F ACP DISCUSS/DSCN MKR DOCD: CPT | Performed by: INTERNAL MEDICINE

## 2024-08-05 PROCEDURE — 3078F DIAST BP <80 MM HG: CPT | Performed by: INTERNAL MEDICINE

## 2024-08-05 NOTE — PROGRESS NOTES
New pt to establish car with pulmonary provider. Pt states she was seen by pulmonary provider over 15 yrs ago and was told she had Asthma and Sarcoidosis. Will plan for PFT and Chest CT to be arranged at [facility] and pt to follow up in office after testing to discuss results. Pt advised office will mail out testing. Appt to follow up in October given.

## 2024-08-05 NOTE — PROGRESS NOTES
Pulmonary Critical Care Medicine      NEW PATIENT VISIT-PULMONARY    8/5/2024     REFERRING PHYSICIAN:  Santiago Porras MD     REASON FOR REFERRAL: Pulmonary sarcoidosis    History of Present Illness    Lucrecia Horn is a 64 y.o. never smoker  female with some degree of secondhand smoke exposure referred here for evaluation of pulmonary sarcoidosis.  She was clinically diagnosed with pulmonary sarcoidosis in 2004 without imaging or biopsy.  Since then she has been given a diagnosis of sarcoidosis.  She had been on intermittent long term steroid therapy for some time.  Now she is off all the steroid therapy.  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  She also had undergone MRI of the heart which suggests infiltrative cardiac sarcoidosis.  She follows up with electrophysiologist and a cardiologist for the same reason.    Symptomatically she has no dyspnea on exertion, cough or shortness of breath in day-to-day physical activities.  She claims that she can climb 2 flights of stairs without stopping.  The only respiratory symptoms she has is early morning cough and scratchy throat with whitish sputum production.  This gets better throughout the day.    She was given a diagnosis of obstructive sleep apnea in the past.  She was also prescribed a CPAP machine but she never used it.  Her  states that he really does not think she has sleep apnea because she sleeps well, occasionally snores but he has never witnessed her stopping breathing in the middle of the night.      Exercise tolerance/MMRC score( Bold )     MMRC Dyspnea Scale  Grade Description of Breathlessness   0 I only get breathless with strenuous exercise.   1 I get short of breath when hurrying on level ground or walking up a slight hill.   2 On level ground, I walk slower than people of the same age because of breathlessness, or have to stop for

## 2024-08-30 ENCOUNTER — HOSPITAL ENCOUNTER (OUTPATIENT)
Dept: CT IMAGING | Age: 64
Discharge: HOME | End: 2024-08-30
Attending: INTERNAL MEDICINE
Payer: MEDICARE

## 2024-08-30 DIAGNOSIS — D86.9 SARCOIDOSIS: ICD-10-CM

## 2024-08-30 PROCEDURE — 71250 CT THORAX DX C-: CPT

## 2024-09-05 ENCOUNTER — HOSPITAL ENCOUNTER (OUTPATIENT)
Dept: PULMONOLOGY | Age: 64
Discharge: HOME | End: 2024-09-05
Attending: INTERNAL MEDICINE
Payer: MEDICARE

## 2024-09-05 DIAGNOSIS — D86.9 SARCOIDOSIS: ICD-10-CM

## 2024-09-05 PROCEDURE — 94726 PLETHYSMOGRAPHY LUNG VOLUMES: CPT

## 2024-09-05 PROCEDURE — 94060 EVALUATION OF WHEEZING: CPT

## 2024-09-05 PROCEDURE — 94729 DIFFUSING CAPACITY: CPT

## 2024-09-24 ENCOUNTER — TELEPHONE (OUTPATIENT)
Dept: BREAST CENTER | Age: 64
End: 2024-09-24

## 2024-09-25 ENCOUNTER — TELEPHONE (OUTPATIENT)
Dept: BREAST CENTER | Age: 64
End: 2024-09-25

## 2024-09-25 DIAGNOSIS — N64.89 OTHER SPECIFIED DISORDERS OF BREAST: Primary | ICD-10-CM

## 2024-09-25 DIAGNOSIS — N63.20 MASS OF LEFT BREAST, UNSPECIFIED QUADRANT: ICD-10-CM

## 2024-09-25 DIAGNOSIS — R93.89 ABNORMAL CT SCAN, CHEST: ICD-10-CM

## 2024-10-11 ENCOUNTER — HOSPITAL ENCOUNTER (OUTPATIENT)
Dept: GENERAL RADIOLOGY | Age: 64
Discharge: HOME | End: 2024-10-13
Payer: MEDICARE

## 2024-10-11 VITALS — BODY MASS INDEX: 27.46 KG/M2 | HEIGHT: 63 IN | WEIGHT: 155 LBS

## 2024-10-11 DIAGNOSIS — N64.89 OTHER SPECIFIED DISORDERS OF BREAST: ICD-10-CM

## 2024-10-11 DIAGNOSIS — R93.89 ABNORMAL CT SCAN, CHEST: ICD-10-CM

## 2024-10-11 DIAGNOSIS — N63.20 MASS OF LEFT BREAST, UNSPECIFIED QUADRANT: ICD-10-CM

## 2024-10-11 PROCEDURE — 76642 ULTRASOUND BREAST LIMITED: CPT

## 2024-10-11 PROCEDURE — G0279 TOMOSYNTHESIS, MAMMO: HCPCS

## 2024-10-16 ENCOUNTER — OFFICE VISIT (OUTPATIENT)
Dept: PULMONOLOGY | Age: 64
End: 2024-10-16
Payer: MEDICARE

## 2024-10-16 VITALS
DIASTOLIC BLOOD PRESSURE: 66 MMHG | SYSTOLIC BLOOD PRESSURE: 119 MMHG | HEART RATE: 73 BPM | RESPIRATION RATE: 20 BRPM | OXYGEN SATURATION: 98 % | TEMPERATURE: 96.9 F

## 2024-10-16 DIAGNOSIS — D86.9 SARCOIDOSIS: Primary | ICD-10-CM

## 2024-10-16 PROCEDURE — 99213 OFFICE O/P EST LOW 20 MIN: CPT | Performed by: INTERNAL MEDICINE

## 2024-10-16 PROCEDURE — 3074F SYST BP LT 130 MM HG: CPT | Performed by: INTERNAL MEDICINE

## 2024-10-16 PROCEDURE — 3078F DIAST BP <80 MM HG: CPT | Performed by: INTERNAL MEDICINE

## 2024-10-16 PROCEDURE — 1123F ACP DISCUSS/DSCN MKR DOCD: CPT | Performed by: INTERNAL MEDICINE

## 2024-10-16 PROCEDURE — 99215 OFFICE O/P EST HI 40 MIN: CPT | Performed by: INTERNAL MEDICINE

## 2024-10-16 RX ORDER — ATORVASTATIN CALCIUM 40 MG/1
40 TABLET, FILM COATED ORAL DAILY
COMMUNITY

## 2024-10-16 RX ORDER — MONTELUKAST SODIUM 10 MG/1
10 TABLET ORAL DAILY
Qty: 30 TABLET | Refills: 3 | Status: SHIPPED | OUTPATIENT
Start: 2024-10-16

## 2024-10-16 RX ORDER — AZELASTINE HYDROCHLORIDE, FLUTICASONE PROPIONATE 137; 50 UG/1; UG/1
1 SPRAY, METERED NASAL
Qty: 1 EACH | Refills: 2 | Status: SHIPPED | OUTPATIENT
Start: 2024-10-16

## 2024-10-16 NOTE — PROGRESS NOTES
Pulmonary Critical Care Medicine      FOLLOW UP  PATIENT VISIT-PULMONARY    10/16/2024     Interval history-   Ms. Lucrecia Horn is being followed here for   Clinically diagnosed sarcoidosis over 20 yrs ago   Chronic clearing of the throat and cough     Since the last visit she underwent CT chest and PFTs , both WNL    Patient feels good ,  is at  baseline health and has no new symptoms or problems . She has had no new symptoms , acute exacerbations , ER visits or hospitalizations  From any respiratory symptoms since the last visit.       Background history -   Lucrecia Horn is a 64 y.o. never smoker  female with some degree of secondhand smoke exposure initially  referred here for evaluation of pulmonary sarcoidosis.  She was clinically diagnosed with pulmonary sarcoidosis in 2004 without imaging or biopsy.  Since then she has been given a diagnosis of sarcoidosis.  She had been on intermittent long term steroid therapy for some time.  Now she is off all the steroid therapy.  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  She also had undergone MRI of the heart which suggests infiltrative cardiac sarcoidosis.  She follows up with electrophysiologist and a cardiologist for the same reason.    Symptomatically she has no dyspnea on exertion, cough or shortness of breath in day-to-day physical activities.  She claims that she can climb 2 flights of stairs without stopping.  The only respiratory symptoms she has is early morning cough and scratchy throat with whitish sputum production.  This gets better throughout the day.    She was given a diagnosis of obstructive sleep apnea in the past.  She was also prescribed a CPAP machine but she never used it.  Her  states that he really does not think she has sleep apnea because she sleeps well, occasionally snores but he has never witnessed her stopping

## 2024-10-16 NOTE — PROGRESS NOTES
Follow up in office today for 2 mos follow up. No changes . Pt to begin Dymista and Singulair meds and follow up in 6 mos; appt given.

## 2024-11-21 ENCOUNTER — TELEPHONE (OUTPATIENT)
Dept: BREAST CENTER | Age: 64
End: 2024-11-21

## 2024-11-21 NOTE — TELEPHONE ENCOUNTER
RN received return call from patient. She had that her appointment was today @ 1pm. RN verbalized understanding and rescheduled patient for 12/12/24 @ 1:30pm with Dr.Reyes.        Electronically signed by Randee Carpio RN on 11/21/24 at 10:44 AM EST

## 2024-11-21 NOTE — TELEPHONE ENCOUNTER
RN Randee Carpio called patient regarding her missed appointment with Dr. Reyes on 11/21/24. Patient did not answer so a message was left asking patient to call office back at [phone] to reschedule this appointment.         Electronically signed by Randee Carpio RN on 11/21/24 at 10:36 AM EST

## 2024-12-10 NOTE — PROGRESS NOTES
Nicole Reyes, DO [facility]  [address] [place] [address]  [phone]    HISTORY & PHYSICAL NOTE     REFERRED BY:  Santiago Porras MD  REASON FOR TODAY'S VISIT:    Chief Complaint   Patient presents with    Breast Problem     Left breast - focal asymmetry  - imaging [place]        SUBJECTIVE     HPI:  Lucrecia Horn is a 64 y.o. female who presents for abnormal CT chest.     Denies any breast issues in the past. Denies any current palpable masses or skin changes. H/o CHF, DM, HLD, hypothyroidism, sarcoidosis.    8/30/2024 -underwent a CT scan of the chest for ruling out sarcoidosis, this did show an incidental finding of a left breast mass measuring 1.2 x 0.9 x 1.3 cm.  Further imaging is recommended.  No axillary lymphadenopathy.    10/11/2024 -bilateral diagnostic mammogram was done which showed BI-RADS density B, there is a 1.8 cm focal asymmetry in the left upper outer breast middle depth.  This asymmetry is not suspiciously changed compared to prior mammograms dating back to 2020.  Stable bilateral mammogram, stable focal asymmetry in the left slightly upper outer breast, BI-RADS 2, recommendation return to annual mammogram.    10/11/2024 -left breast ultrasound was done for the targeted mass found on mammogram and there was no sonographic correlate.    BREAST HISTORY  Her past breast history (prior to this encounter) is as follows:  None.    Has the patient had children? Yes; age of patient at birth of first child, 19 years old.  Approximate age at first menstrual period? 14  Has the patient gone through menopause? Yes; menopause age, 48 years old.  Ashkenazi Yazdanism descent? No  Has the patient ever used hormone replacement therapy? No  Has the patient ever had a breast biopsy? No  Breast Density: B  Family History of Breast Cancer? None  Family History of Ovarian Cancer? None  Family History of other Cancer? Lymphoma in mother and lung cancer in

## 2024-12-12 ENCOUNTER — OFFICE VISIT (OUTPATIENT)
Dept: BREAST CENTER | Age: 64
End: 2024-12-12
Payer: MEDICARE

## 2024-12-12 VITALS
HEIGHT: 63 IN | OXYGEN SATURATION: 95 % | TEMPERATURE: 98.2 F | SYSTOLIC BLOOD PRESSURE: 122 MMHG | HEART RATE: 70 BPM | RESPIRATION RATE: 20 BRPM | WEIGHT: 159 LBS | BODY MASS INDEX: 28.17 KG/M2 | DIASTOLIC BLOOD PRESSURE: 70 MMHG

## 2024-12-12 DIAGNOSIS — N64.89 BREAST ASYMMETRY: Primary | ICD-10-CM

## 2024-12-12 PROCEDURE — 99203 OFFICE O/P NEW LOW 30 MIN: CPT | Performed by: STUDENT IN AN ORGANIZED HEALTH CARE EDUCATION/TRAINING PROGRAM

## 2024-12-12 PROCEDURE — 3074F SYST BP LT 130 MM HG: CPT | Performed by: STUDENT IN AN ORGANIZED HEALTH CARE EDUCATION/TRAINING PROGRAM

## 2024-12-12 PROCEDURE — 1159F MED LIST DOCD IN RCRD: CPT | Performed by: STUDENT IN AN ORGANIZED HEALTH CARE EDUCATION/TRAINING PROGRAM

## 2024-12-12 PROCEDURE — 1123F ACP DISCUSS/DSCN MKR DOCD: CPT | Performed by: STUDENT IN AN ORGANIZED HEALTH CARE EDUCATION/TRAINING PROGRAM

## 2024-12-12 PROCEDURE — 3078F DIAST BP <80 MM HG: CPT | Performed by: STUDENT IN AN ORGANIZED HEALTH CARE EDUCATION/TRAINING PROGRAM

## 2024-12-12 PROCEDURE — 1160F RVW MEDS BY RX/DR IN RCRD: CPT | Performed by: STUDENT IN AN ORGANIZED HEALTH CARE EDUCATION/TRAINING PROGRAM

## 2025-01-30 ENCOUNTER — TELEPHONE (OUTPATIENT)
Dept: NON INVASIVE DIAGNOSTICS | Age: 65
End: 2025-01-30

## 2025-01-30 NOTE — TELEPHONE ENCOUNTER
----- Message from SARA ALCOCER RN sent at 1/23/2025  2:00 PM EST -----  SJM device. TB patient. Due for office visit

## 2025-03-07 ENCOUNTER — TELEPHONE (OUTPATIENT)
Dept: PULMONOLOGY | Age: 65
End: 2025-03-07

## 2025-03-07 NOTE — TELEPHONE ENCOUNTER
Pt mailed letter to advised new appt has been given for April appt. New appt May 12@1130am given due to Dr Hartman's schedule change.

## 2025-03-14 ENCOUNTER — OFFICE VISIT (OUTPATIENT)
Dept: NON INVASIVE DIAGNOSTICS | Age: 65
End: 2025-03-14

## 2025-03-14 ENCOUNTER — NURSE ONLY (OUTPATIENT)
Dept: NON INVASIVE DIAGNOSTICS | Age: 65
End: 2025-03-14

## 2025-03-14 VITALS
HEIGHT: 63 IN | OXYGEN SATURATION: 97 % | RESPIRATION RATE: 18 BRPM | DIASTOLIC BLOOD PRESSURE: 62 MMHG | TEMPERATURE: 96.2 F | SYSTOLIC BLOOD PRESSURE: 108 MMHG | WEIGHT: 157.4 LBS | HEART RATE: 61 BPM | BODY MASS INDEX: 27.89 KG/M2

## 2025-03-14 DIAGNOSIS — Z95.810 CARDIAC RESYNCHRONIZATION THERAPY DEFIBRILLATOR (CRT-D) IN PLACE: Primary | ICD-10-CM

## 2025-03-14 DIAGNOSIS — I50.42 CHRONIC COMBINED SYSTOLIC AND DIASTOLIC HEART FAILURE (HCC): ICD-10-CM

## 2025-03-14 DIAGNOSIS — I49.9 IRREGULAR HEART BEAT: Primary | ICD-10-CM

## 2025-03-14 DIAGNOSIS — G47.30 SLEEP APNEA, UNSPECIFIED TYPE: ICD-10-CM

## 2025-03-14 NOTE — PROGRESS NOTES
[facility]  Outpatient Progress Note  Lucrecia Horn  1960  Date of Service: 3/14/2025  PCP: Santiago Porras MD  Cardiologist: Dr Cabrera  Electrophysiologist: Dr. Dobson      Subjective: Lucrecia Horn  is a 64 y.o. female with a history of NYHA class II HFpEF- improved/nonischemic 2/2 LBBB (non-Rhianna) +/- ?cardiac sarcoidosissp Abbott CRT-D (DOI: 2/2019 at [facility]; LV lead revision with extraction of old LV lead and implant of new LV lead: 7/23/2020 at [facility]), HTN, MR, ?pulmonary sarcoidosis, ROSELYN noncompliant with CPAP, prior alcohol abuse, varicose veins sp stripping, and GERD.  She is managed by Dr. Cabrera with atorvastatin 40 mg daily, carvedilol 6.25 mg twice daily, empagliflozin 25 mg daily, Entresto 24-26 mg twice daily, and spironolactone 25 mg daily. In 2004 she was diagnosed of pulmonary sarcoidosis by pulmonologist  and treated with steroid for short period. Unclear how this diagnosis was made as patient denies any prior biopsy. However, in 2011, she was reportedly told she did not have sarcoidosis. In 2011, TTE reported LVEF of 20%.  A cardiac MRI reported LGE concerning for cardiac sarcoidosis. She was referred to [facility] Card[facility], but reportedly not felt to have cardiac sarcoidosis on review of note (Dr Heaton). She was treated with GDMT, but not steroids.  In 2015 or earlier, she was diagnosed with LBBB of 130 msec (no prior ECGs available). TTE at that time reported LVEF of 47%. In 2018, LBBB increased to 158 msec and TTE reported LVEF reduced 35%. Cardiac MRI was concerning for ischemic or infiltrative disease. On review of [facility] notes, this result appears to not have been reviewed with the patient (only reference the 2012 cMRI). A [place] CRT-D was implanted.  In 2020, she required LV lead revision with extraction of old LV lead and implant of new LV lead due to elevated LV lead thresholds and diaphragm stim.

## 2025-03-14 NOTE — PATIENT INSTRUCTIONS
No medication changes today.  Continue remote monitoring of device every 91 days.  You were referred to Sleep Medicine. They will contact you to schedule an appointment.  Dr Dobson office will contact PCP's office for a copy of recent lab tests.  Follow-up with this office in ~1 year.

## 2025-03-24 ENCOUNTER — TELEPHONE (OUTPATIENT)
Dept: NON INVASIVE DIAGNOSTICS | Age: 65
End: 2025-03-24

## 2025-03-24 NOTE — TELEPHONE ENCOUNTER
----- Message from Dr. Elio Dobson DO sent at 3/14/2025 10:52 AM EDT -----  Regarding: pcp labs  Please obtain recent lab tests from PCP's office.    She needs CBC, CMP, TSH, Free T4, and Mg. If not done at PCP's office, please arrange.    -Elio Dobson DO

## 2025-05-09 PROCEDURE — 93297 REM INTERROG DEV EVAL ICPMS: CPT | Performed by: STUDENT IN AN ORGANIZED HEALTH CARE EDUCATION/TRAINING PROGRAM

## 2025-06-09 ENCOUNTER — TELEPHONE (OUTPATIENT)
Age: 65
End: 2025-06-09

## 2025-06-09 NOTE — TELEPHONE ENCOUNTER
RN received incoming call from patient who states she would like to cancel her upcoming appointment with Dr.Reyes on 6/12/25. RN offered to reschedule patient appointment. Patient doesn't wish to continue care and follow ups with our office. RN canceled appointment and will send message to Dr.Reyes for informational purposes.       Electronically signed by Randee Joyce RN on 6/9/25 at 11:43 AM EDT

## 2025-06-10 NOTE — PROGRESS NOTES
[facility] Sleep Medicine    Patient Name: Lucrecia Horn  Age: 65 y.o.   : 1960  Date of Visit: 6/10/25    Assessment and Plan:     1. ROSELYN (obstructive sleep apnea)  high pre-test probability of ROSELYN.We will pursue home sleep testing for further evaluation given symptoms listed below.      2. HFrEF (heart failure with reduced ejection fraction) (HCC)  Described relationship with HFrEF and untreated ROSELYN  Recommend therapy if she is still to have ROSELYN, which is likely given symptoms below  Will pursue HST for given patient convenience.    History:    HPI   Lucrecia Horn is a 65 y.o. female with  has a past medical history of CHF (congestive heart failure) (), Diabetes mellitus (), Hyperlipidemia, Hypothyroidism, Sarcoidosis (), Sleep apnea, and Thyroid disease. who presents as a new patient to Sleep Clinic, referred by Dr. Dobson, for Sleep Apnea  .    SLEEP STUDY HISTORY    No specialty comments available.    - Snoring, choking/gasping, witnessed apneas  - Prior history of ROSELYN requiring CPAP  - Had pressure intolerance and has not used it in years  - Re-evaluation for ROSELYN in the setting of HFrEF  - No alcohol, tobacco, or drug use    PMH:  Past Medical History:   Diagnosis Date    CHF (congestive heart failure) (HCC)     Diabetes mellitus (HCC)     Hyperlipidemia     Hypothyroidism     Sarcoidosis 2007    Sleep apnea     Thyroid disease         PSH:  Past Surgical History:   Procedure Laterality Date    CARDIAC DEFIBRILLATOR PLACEMENT  2006     SECTION      PACEMAKER INSERTION  2006    VEIN LIGATION          Soc Hx:  Social History     Tobacco Use    Smoking status: Never    Smokeless tobacco: Never   Vaping Use    Vaping status: Never Used   Substance Use Topics    Alcohol use: Not Currently    Drug use: Never        Fam Hx:  Family History   Problem Relation Age of Onset    Cancer Mother 50        lymphoma    Heart Disease Father     Cancer Sister 59        lung cancer        Current

## 2025-06-11 ENCOUNTER — OFFICE VISIT (OUTPATIENT)
Dept: SLEEP CENTER | Age: 65
End: 2025-06-11
Payer: MEDICARE

## 2025-06-11 VITALS
HEART RATE: 78 BPM | RESPIRATION RATE: 14 BRPM | HEIGHT: 63 IN | TEMPERATURE: 98 F | SYSTOLIC BLOOD PRESSURE: 122 MMHG | OXYGEN SATURATION: 96 % | WEIGHT: 156.09 LBS | DIASTOLIC BLOOD PRESSURE: 77 MMHG | BODY MASS INDEX: 27.66 KG/M2

## 2025-06-11 DIAGNOSIS — G47.33 OSA (OBSTRUCTIVE SLEEP APNEA): Primary | ICD-10-CM

## 2025-06-11 DIAGNOSIS — I50.20 HFREF (HEART FAILURE WITH REDUCED EJECTION FRACTION) (HCC): ICD-10-CM

## 2025-06-11 PROCEDURE — 1123F ACP DISCUSS/DSCN MKR DOCD: CPT | Performed by: STUDENT IN AN ORGANIZED HEALTH CARE EDUCATION/TRAINING PROGRAM

## 2025-06-11 PROCEDURE — 3078F DIAST BP <80 MM HG: CPT | Performed by: STUDENT IN AN ORGANIZED HEALTH CARE EDUCATION/TRAINING PROGRAM

## 2025-06-11 PROCEDURE — 3074F SYST BP LT 130 MM HG: CPT | Performed by: STUDENT IN AN ORGANIZED HEALTH CARE EDUCATION/TRAINING PROGRAM

## 2025-06-11 PROCEDURE — 99204 OFFICE O/P NEW MOD 45 MIN: CPT | Performed by: STUDENT IN AN ORGANIZED HEALTH CARE EDUCATION/TRAINING PROGRAM

## 2025-06-11 PROCEDURE — 1159F MED LIST DOCD IN RCRD: CPT | Performed by: STUDENT IN AN ORGANIZED HEALTH CARE EDUCATION/TRAINING PROGRAM

## 2025-06-11 RX ORDER — ORAL SEMAGLUTIDE 3 MG/1
3 TABLET ORAL DAILY
COMMUNITY
Start: 2025-05-23

## 2025-06-11 ASSESSMENT — SLEEP AND FATIGUE QUESTIONNAIRES
HOW LIKELY ARE YOU TO NOD OFF OR FALL ASLEEP WHILE SITTING INACTIVE IN A PUBLIC PLACE: WOULD NEVER DOZE
HOW LIKELY ARE YOU TO NOD OFF OR FALL ASLEEP WHEN YOU ARE A PASSENGER IN A CAR FOR AN HOUR WITHOUT A BREAK: WOULD NEVER DOZE
HOW LIKELY ARE YOU TO NOD OFF OR FALL ASLEEP WHILE SITTING QUIETLY AFTER LUNCH WITHOUT ALCOHOL: WOULD NEVER DOZE
ESS TOTAL SCORE: 2
HOW LIKELY ARE YOU TO NOD OFF OR FALL ASLEEP WHILE SITTING AND READING: WOULD NEVER DOZE
HOW LIKELY ARE YOU TO NOD OFF OR FALL ASLEEP WHILE SITTING AND TALKING TO SOMEONE: WOULD NEVER DOZE
HOW LIKELY ARE YOU TO NOD OFF OR FALL ASLEEP IN A CAR, WHILE STOPPED FOR A FEW MINUTES IN TRAFFIC: WOULD NEVER DOZE
HOW LIKELY ARE YOU TO NOD OFF OR FALL ASLEEP WHILE WATCHING TV: SLIGHT CHANCE OF DOZING
HOW LIKELY ARE YOU TO NOD OFF OR FALL ASLEEP WHILE LYING DOWN TO REST IN THE AFTERNOON WHEN CIRCUMSTANCES PERMIT: SLIGHT CHANCE OF DOZING

## 2025-07-03 ENCOUNTER — HOSPITAL ENCOUNTER (OUTPATIENT)
Dept: SLEEP CENTER | Age: 65
Discharge: HOME | End: 2025-07-03
Payer: MEDICARE

## 2025-07-03 DIAGNOSIS — G47.33 OSA (OBSTRUCTIVE SLEEP APNEA): ICD-10-CM

## 2025-07-03 PROCEDURE — 95800 SLP STDY UNATTENDED: CPT

## 2025-08-08 PROCEDURE — 93297 REM INTERROG DEV EVAL ICPMS: CPT | Performed by: STUDENT IN AN ORGANIZED HEALTH CARE EDUCATION/TRAINING PROGRAM

## 2025-08-11 ENCOUNTER — TELEPHONE (OUTPATIENT)
Dept: SLEEP CENTER | Age: 65
End: 2025-08-11

## 2025-08-11 DIAGNOSIS — G47.33 OSA (OBSTRUCTIVE SLEEP APNEA): Primary | ICD-10-CM
